# Patient Record
Sex: MALE | Race: WHITE | NOT HISPANIC OR LATINO | Employment: UNEMPLOYED | URBAN - METROPOLITAN AREA
[De-identification: names, ages, dates, MRNs, and addresses within clinical notes are randomized per-mention and may not be internally consistent; named-entity substitution may affect disease eponyms.]

---

## 2022-03-15 ENCOUNTER — OFFICE VISIT (OUTPATIENT)
Dept: FAMILY MEDICINE | Facility: CLINIC | Age: 62
End: 2022-03-15

## 2022-03-15 VITALS
DIASTOLIC BLOOD PRESSURE: 83 MMHG | BODY MASS INDEX: 27.77 KG/M2 | OXYGEN SATURATION: 98 % | RESPIRATION RATE: 18 BRPM | WEIGHT: 205 LBS | HEIGHT: 72 IN | HEART RATE: 82 BPM | SYSTOLIC BLOOD PRESSURE: 170 MMHG

## 2022-03-15 DIAGNOSIS — F17.200 SMOKER: ICD-10-CM

## 2022-03-15 DIAGNOSIS — Z76.89 ESTABLISHING CARE WITH NEW DOCTOR, ENCOUNTER FOR: ICD-10-CM

## 2022-03-15 DIAGNOSIS — I10 HYPERTENSION, UNSPECIFIED TYPE: Primary | ICD-10-CM

## 2022-03-15 DIAGNOSIS — R06.83 LOUD SNORING: ICD-10-CM

## 2022-03-15 PROCEDURE — 99499 NO LOS: ICD-10-PCS | Mod: S$GLB,,, | Performed by: NURSE PRACTITIONER

## 2022-03-15 PROCEDURE — 99203 PR OFFICE/OUTPT VISIT, NEW, LEVL III, 30-44 MIN: ICD-10-PCS | Mod: S$GLB,,, | Performed by: NURSE PRACTITIONER

## 2022-03-15 PROCEDURE — 99203 OFFICE O/P NEW LOW 30 MIN: CPT | Mod: S$GLB,,, | Performed by: NURSE PRACTITIONER

## 2022-03-15 PROCEDURE — 99499 UNLISTED E&M SERVICE: CPT | Mod: S$GLB,,, | Performed by: NURSE PRACTITIONER

## 2022-03-15 RX ORDER — LISINOPRIL AND HYDROCHLOROTHIAZIDE 20; 25 MG/1; MG/1
1 TABLET ORAL DAILY
COMMUNITY
Start: 2021-11-17 | End: 2022-03-15 | Stop reason: SDUPTHER

## 2022-03-15 RX ORDER — AMLODIPINE BESYLATE 5 MG/1
5 TABLET ORAL DAILY
Qty: 30 TABLET | Refills: 5 | Status: SHIPPED | OUTPATIENT
Start: 2022-03-15 | End: 2022-09-19 | Stop reason: SDUPTHER

## 2022-03-15 RX ORDER — ASPIRIN 81 MG/1
81 TABLET ORAL DAILY
COMMUNITY

## 2022-03-15 RX ORDER — LISINOPRIL AND HYDROCHLOROTHIAZIDE 20; 25 MG/1; MG/1
1 TABLET ORAL DAILY
Qty: 30 TABLET | Refills: 5 | Status: SHIPPED | OUTPATIENT
Start: 2022-03-15 | End: 2022-09-19 | Stop reason: SDUPTHER

## 2022-03-15 NOTE — PROGRESS NOTES
Subjective:      Patient ID: Sabas Augustine is a 61 y.o. male.    Chief Complaint: Establish Care and Hypertension      61-year-old male    Current Smoker    Past medical history hypertension      Pt is here today to establish PCP. Previous PCP out of [facility].     In regards to his high blood pressure, the patient is on lisinopril/HCTZ 20/25 mg. Took his med this AM around 5 am.  He is currently asymptomatic. Has been on BP meds for apx 1 year. No prior labs. No prior cardiac workup. He currently does not have insurance and can not afford further testing. Denies HA, visual disturbance, CP, palpitations, Sob, HALEY.     No acute issues reported.       Past Medical History:   Diagnosis Date    Hyperlipidemia     Hypertension       Social History     Socioeconomic History    Marital status:    Tobacco Use    Smoking status: Current Every Day Smoker     Packs/day: 1.00     Types: Cigarettes    Smokeless tobacco: Never Used   Substance and Sexual Activity    Alcohol use: Yes     Comment: rarely    Drug use: Never      Family History   Problem Relation Age of Onset    Heart disease Father     Cancer Father         Esophagus        ROS:   Review of Systems   Constitutional: Negative for appetite change, chills and fever.   Eyes: Negative for photophobia and visual disturbance.   Respiratory: Negative for cough, chest tightness, shortness of breath and wheezing.    Cardiovascular: Negative for chest pain.   Gastrointestinal: Negative for abdominal pain, diarrhea, nausea and vomiting.   Endocrine: Negative for polydipsia, polyphagia and polyuria.   Genitourinary: Negative for difficulty urinating, dysuria, flank pain and hematuria.   Musculoskeletal: Negative for back pain and neck pain.   Skin: Negative for rash.   Neurological: Negative for dizziness, tremors, syncope, facial asymmetry, light-headedness and headaches.   Hematological: Negative for adenopathy. Does not bruise/bleed easily.    Psychiatric/Behavioral: Negative for confusion, dysphoric mood, hallucinations, sleep disturbance and suicidal ideas.     Objective:   Physical Exam  Vitals and nursing note reviewed.   Constitutional:       General: He is awake. He is not in acute distress.     Appearance: Normal appearance. He is well-developed, well-groomed and overweight. He is not ill-appearing, toxic-appearing or diaphoretic.   HENT:      Head: Normocephalic and atraumatic.   Eyes:      General: No scleral icterus.     Pupils: Pupils are equal, round, and reactive to light.   Neck:      Vascular: No carotid bruit.   Cardiovascular:      Rate and Rhythm: Normal rate and regular rhythm.      Pulses: Normal pulses.      Heart sounds: Normal heart sounds. No murmur heard.    No friction rub. No gallop.   Pulmonary:      Effort: Pulmonary effort is normal.      Breath sounds: Examination of the right-lower field reveals decreased breath sounds. Examination of the left-lower field reveals decreased breath sounds. Decreased breath sounds present. No wheezing, rhonchi or rales.   Chest:   Breasts:      Right: No supraclavicular adenopathy.      Left: No supraclavicular adenopathy.       Abdominal:      General: Abdomen is protuberant. Bowel sounds are normal.      Palpations: Abdomen is soft.   Musculoskeletal:         General: Normal range of motion.      Cervical back: Normal range of motion and neck supple.      Right lower leg: No edema.      Left lower leg: No edema.   Lymphadenopathy:      Cervical: No cervical adenopathy.      Upper Body:      Right upper body: No supraclavicular adenopathy.      Left upper body: No supraclavicular adenopathy.   Skin:     General: Skin is warm and dry.      Capillary Refill: Capillary refill takes less than 2 seconds.      Coloration: Skin is not jaundiced.      Findings: No rash.   Neurological:      Mental Status: He is alert and oriented to person, place, and time. Mental status is at baseline.      Motor:  No weakness.      Gait: Gait normal.   Psychiatric:         Mood and Affect: Mood normal.         Behavior: Behavior normal. Behavior is cooperative.         Thought Content: Thought content normal.         Judgment: Judgment normal.       Assessment:     1. Hypertension, unspecified type    2. Establishing care with new doctor, encounter for    3. Smoker    4. Loud snoring      No images are attached to the encounter.   Plan:     Problem List Items Addressed This Visit        Cardiac/Vascular    Hypertension - Primary    Current Assessment & Plan     We discussed at length what uncontrolled high blood pressure can due to the body specifically the heart and renal.  He is uninsured at this point cannot afford further testing.  I advised the patient to get insurance and return to clinic once he has insurance so we can get the proper workup.      Plan to refer to Cardiology and get basline labs at that visit. For now, will add Amlodipine 5 mg to his current med. He will check his BP and notify me if he has not achieved target of < 140.            Relevant Medications    amLODIPine (NORVASC) 5 MG tablet    lisinopriL-hydrochlorothiazide (PRINZIDE,ZESTORETIC) 20-25 mg Tab       Other    Smoker      Other Visit Diagnoses     Establishing care with new doctor, encounter for        Relevant Medications    amLODIPine (NORVASC) 5 MG tablet    lisinopriL-hydrochlorothiazide (PRINZIDE,ZESTORETIC) 20-25 mg Tab    Loud snoring        likely has SAMMY contributing to HTN. Will eventually need SAMMY study.         Procedures     No results found for any previous visit.        No results found in the last 30 days.     All diagnostic data (labs/imaging) was reviewed with the patient and/or family member in the room.  All questions were answered to their liking. The patient and/or family member voiced understanding of all instructions provided. Expectations regarding follow up and treatment plan were voiced and confirmed prior to  departure. The patient was given orders/instructions at the end of the visit for reference. They were instructed to notify my office if they have not been contacted for imaging/referrals/labs/results in 1-2 weeks. They voiced understanding of all of the above.     Follow up:     There are no Patient Instructions on file for this visit.     Follow up in about 6 months (around 9/15/2022), or if symptoms worsen or fail to improve.

## 2022-03-15 NOTE — ASSESSMENT & PLAN NOTE
We discussed at length what uncontrolled high blood pressure can due to the body specifically the heart and renal.  He is uninsured at this point cannot afford further testing.  I advised the patient to get insurance and return to clinic once he has insurance so we can get the proper workup.      Plan to refer to Cardiology and get basline labs at that visit. For now, will add Amlodipine 5 mg to his current med. He will check his BP and notify me if he has not achieved target of < 140.

## 2023-03-08 DIAGNOSIS — I10 HYPERTENSION: ICD-10-CM

## 2023-10-03 DIAGNOSIS — Z76.89 ESTABLISHING CARE WITH NEW DOCTOR, ENCOUNTER FOR: ICD-10-CM

## 2023-10-03 DIAGNOSIS — I10 HYPERTENSION, UNSPECIFIED TYPE: ICD-10-CM

## 2023-10-03 NOTE — TELEPHONE ENCOUNTER
----- Message from Esther Daniel sent at 10/3/2023  1:36 PM CDT -----  Contact: pt  Pt wife calling about status of refill for pt b/p medication for lisinopriL-hydrochlorothiazide (PRINZIDE,ZESTORETIC) 20-25 mg and amLODIPine (NORVASC) 5 MG.   Pt taking last pill today.   Pt called yesterday.  Pt can be reached at [phone] (home)       [facility]/pharmacy #[id number] - [place], [place] - [address] [facility]  [address]  [place] [address]  Phone: [phone] Fax: [phone]    Thanks,

## 2023-10-04 RX ORDER — AMLODIPINE BESYLATE 5 MG/1
5 TABLET ORAL DAILY
Qty: 12 TABLET | Refills: 0 | Status: SHIPPED | OUTPATIENT
Start: 2023-10-04 | End: 2023-10-13 | Stop reason: SDUPTHER

## 2023-10-04 RX ORDER — LISINOPRIL AND HYDROCHLOROTHIAZIDE 20; 25 MG/1; MG/1
1 TABLET ORAL DAILY
Qty: 12 TABLET | Refills: 0 | Status: SHIPPED | OUTPATIENT
Start: 2023-10-04 | End: 2023-10-13 | Stop reason: SDUPTHER

## 2023-10-13 ENCOUNTER — OFFICE VISIT (OUTPATIENT)
Dept: FAMILY MEDICINE | Facility: CLINIC | Age: 63
End: 2023-10-13

## 2023-10-13 VITALS — DIASTOLIC BLOOD PRESSURE: 84 MMHG | SYSTOLIC BLOOD PRESSURE: 132 MMHG | WEIGHT: 202 LBS | BODY MASS INDEX: 27.4 KG/M2

## 2023-10-13 DIAGNOSIS — Z12.5 SCREENING FOR MALIGNANT NEOPLASM OF PROSTATE: ICD-10-CM

## 2023-10-13 DIAGNOSIS — Z00.00 PREVENTATIVE HEALTH CARE: Primary | ICD-10-CM

## 2023-10-13 DIAGNOSIS — Z91.89 ENCOUNTER FOR HEPATITIS C VIRUS SCREENING TEST FOR HIGH RISK PATIENT: ICD-10-CM

## 2023-10-13 DIAGNOSIS — Z76.0 ENCOUNTER FOR MEDICATION REFILL: ICD-10-CM

## 2023-10-13 DIAGNOSIS — Z79.899 LONG TERM USE OF DRUG: ICD-10-CM

## 2023-10-13 DIAGNOSIS — Z11.59 ENCOUNTER FOR HEPATITIS C VIRUS SCREENING TEST FOR HIGH RISK PATIENT: ICD-10-CM

## 2023-10-13 DIAGNOSIS — I10 HYPERTENSION, UNSPECIFIED TYPE: ICD-10-CM

## 2023-10-13 DIAGNOSIS — Z13.220 SCREENING FOR LIPOID DISORDERS: ICD-10-CM

## 2023-10-13 DIAGNOSIS — F17.200 SMOKER: ICD-10-CM

## 2023-10-13 DIAGNOSIS — Z13.1 SCREENING FOR DIABETES MELLITUS: ICD-10-CM

## 2023-10-13 DIAGNOSIS — Z13.6 SCREENING, ISCHEMIC HEART DISEASE: ICD-10-CM

## 2023-10-13 DIAGNOSIS — Z11.4 SCREENING FOR HIV (HUMAN IMMUNODEFICIENCY VIRUS): ICD-10-CM

## 2023-10-13 PROCEDURE — 99396 PREV VISIT EST AGE 40-64: CPT | Mod: S$GLB,,, | Performed by: NURSE PRACTITIONER

## 2023-10-13 PROCEDURE — 99396 PR PREVENTIVE VISIT,EST,40-64: ICD-10-PCS | Mod: S$GLB,,, | Performed by: NURSE PRACTITIONER

## 2023-10-13 RX ORDER — AMLODIPINE BESYLATE 5 MG/1
5 TABLET ORAL DAILY
Qty: 90 TABLET | Refills: 2 | Status: SHIPPED | OUTPATIENT
Start: 2023-10-13

## 2023-10-13 RX ORDER — LISINOPRIL AND HYDROCHLOROTHIAZIDE 20; 25 MG/1; MG/1
1 TABLET ORAL DAILY
Qty: 90 TABLET | Refills: 2 | Status: SHIPPED | OUTPATIENT
Start: 2023-10-13

## 2023-10-13 NOTE — ASSESSMENT & PLAN NOTE
PLAN     get basline labs. We discussed at length what uncontrolled high blood pressure can due to the body specifically the heart and renal.  He is uninsured at this point cannot afford further testing.  I advised the patient to discuss cost of labs with the Pathology lab. He was given orders prior to departure. For now, continue current meds.     He will eventually need further cardiac workup. He voiced understanding

## 2023-10-13 NOTE — PROGRESS NOTES
Subjective:      Patient ID: Sabas Augustine is a 63 y.o. male.    Chief Complaint: Follow-up      61-year-old male    Current Smoker    Past medical history hypertension      Pt is here today for AWV, med refills, and follow up. He remains uninsured.  No prior colonoscopy.     In regards to his high blood pressure, the patient is on lisinopril/HCTZ 20/25 mg and norvasc 5 mg. He is currently asymptomatic. No prior cardiac workup. He currently does not have insurance and can not afford further testing. Denies HA, visual disturbance, CP, palpitations, Sob, HALEY.     No acute issues reported.         Past Medical History:   Diagnosis Date    Hyperlipidemia     Hypertension       Social History     Socioeconomic History    Marital status:    Tobacco Use    Smoking status: Every Day     Current packs/day: 1.00     Types: Cigarettes    Smokeless tobacco: Never   Substance and Sexual Activity    Alcohol use: Yes     Comment: rarely    Drug use: Never      Family History   Problem Relation Age of Onset    Heart disease Father     Cancer Father         Esophagus        ROS:   Review of Systems   Constitutional:  Negative for appetite change, chills and fever.   Eyes:  Negative for photophobia and visual disturbance.   Respiratory:  Negative for cough, chest tightness, shortness of breath and wheezing.    Cardiovascular:  Negative for chest pain.   Gastrointestinal:  Negative for abdominal pain, diarrhea, nausea and vomiting.   Endocrine: Negative for polydipsia, polyphagia and polyuria.   Genitourinary:  Negative for difficulty urinating, dysuria, flank pain and hematuria.   Musculoskeletal:  Negative for back pain and neck pain.   Skin:  Negative for rash.   Neurological:  Negative for dizziness, tremors, syncope, facial asymmetry, light-headedness and headaches.   Hematological:  Negative for adenopathy. Does not bruise/bleed easily.   Psychiatric/Behavioral:  Negative for confusion, dysphoric mood, hallucinations,  sleep disturbance and suicidal ideas.      Objective:   Physical Exam  Vitals and nursing note reviewed.   Constitutional:       General: He is awake. He is not in acute distress.     Appearance: Normal appearance. He is well-developed, well-groomed and overweight. He is not ill-appearing, toxic-appearing or diaphoretic.   HENT:      Head: Normocephalic and atraumatic.   Eyes:      General: No scleral icterus.     Pupils: Pupils are equal, round, and reactive to light.   Neck:      Vascular: No carotid bruit.   Cardiovascular:      Rate and Rhythm: Normal rate and regular rhythm.      Pulses: Normal pulses.      Heart sounds: Normal heart sounds. No murmur heard.     No friction rub. No gallop.   Pulmonary:      Effort: Pulmonary effort is normal.      Breath sounds: Examination of the right-lower field reveals decreased breath sounds. Examination of the left-lower field reveals decreased breath sounds. Decreased breath sounds present. No wheezing, rhonchi or rales.   Abdominal:      General: Abdomen is protuberant. Bowel sounds are normal.      Palpations: Abdomen is soft.   Musculoskeletal:         General: Normal range of motion.      Cervical back: Normal range of motion and neck supple.      Right lower leg: No edema.      Left lower leg: No edema.   Lymphadenopathy:      Cervical: No cervical adenopathy.      Upper Body:      Right upper body: No supraclavicular adenopathy.      Left upper body: No supraclavicular adenopathy.   Skin:     General: Skin is warm and dry.      Capillary Refill: Capillary refill takes less than 2 seconds.      Coloration: Skin is not jaundiced.      Findings: No rash.   Neurological:      Mental Status: He is alert and oriented to person, place, and time. Mental status is at baseline.      Motor: No weakness.      Gait: Gait normal.   Psychiatric:         Mood and Affect: Mood normal.         Behavior: Behavior normal. Behavior is cooperative.         Thought Content: Thought  content normal.         Judgment: Judgment normal.       Assessment:     1. Preventative health care    2. Hypertension, unspecified type    3. Encounter for medication refill    4. Long term use of drug    5. Screening for lipoid disorders    6. Screening for malignant neoplasm of prostate    7. Screening for diabetes mellitus    8. Screening, ischemic heart disease    9. Encounter for hepatitis C virus screening test for high risk patient    10. Screening for HIV (human immunodeficiency virus)    11. Smoker      No images are attached to the encounter.   Plan:     Problem List Items Addressed This Visit          Cardiac/Vascular    Hypertension    Current Assessment & Plan         PLAN     get basline labs. We discussed at length what uncontrolled high blood pressure can due to the body specifically the heart and renal.  He is uninsured at this point cannot afford further testing.  I advised the patient to discuss cost of labs with the Pathology lab. He was given orders prior to departure. For now, continue current meds.     He will eventually need further cardiac workup. He voiced understanding             Relevant Medications    amLODIPine (NORVASC) 5 MG tablet    lisinopriL-hydrochlorothiazide (PRINZIDE,ZESTORETIC) 20-25 mg Tab    Other Relevant Orders    Vitamin D    Prostate Specific Antigen, Diagnostic    Urinalysis, Reflex to Urine Culture Urine, Clean Catch    TSH+Free T4    Lipid Panel    Hemoglobin A1C    Comprehensive Metabolic Panel    CBC Auto Differential       Other    Smoker     Other Visit Diagnoses       Preventative health care    -  Primary    will get labs. Unable to afford colonoscopy. Refuses vaccinations.     Relevant Orders    Vitamin D    Prostate Specific Antigen, Diagnostic    Urinalysis, Reflex to Urine Culture Urine, Clean Catch    TSH+Free T4    Lipid Panel    Hemoglobin A1C    Comprehensive Metabolic Panel    CBC Auto Differential    Hepatitis C Antibody    HIV 1/2 Ag/Ab (4th Gen)     Encounter for medication refill        Relevant Orders    Vitamin D    Prostate Specific Antigen, Diagnostic    Urinalysis, Reflex to Urine Culture Urine, Clean Catch    TSH+Free T4    Lipid Panel    Hemoglobin A1C    Comprehensive Metabolic Panel    CBC Auto Differential    Long term use of drug        Relevant Orders    Vitamin D    Prostate Specific Antigen, Diagnostic    Urinalysis, Reflex to Urine Culture Urine, Clean Catch    TSH+Free T4    Lipid Panel    Hemoglobin A1C    Comprehensive Metabolic Panel    CBC Auto Differential    Screening for lipoid disorders        Relevant Orders    Vitamin D    Prostate Specific Antigen, Diagnostic    Urinalysis, Reflex to Urine Culture Urine, Clean Catch    TSH+Free T4    Lipid Panel    Hemoglobin A1C    Comprehensive Metabolic Panel    CBC Auto Differential    Screening for malignant neoplasm of prostate        Relevant Orders    Vitamin D    Prostate Specific Antigen, Diagnostic    Urinalysis, Reflex to Urine Culture Urine, Clean Catch    TSH+Free T4    Lipid Panel    Hemoglobin A1C    Comprehensive Metabolic Panel    CBC Auto Differential    Screening for diabetes mellitus        Relevant Orders    Vitamin D    Prostate Specific Antigen, Diagnostic    Urinalysis, Reflex to Urine Culture Urine, Clean Catch    TSH+Free T4    Lipid Panel    Hemoglobin A1C    Comprehensive Metabolic Panel    CBC Auto Differential    Screening, ischemic heart disease        Relevant Orders    Vitamin D    Prostate Specific Antigen, Diagnostic    Urinalysis, Reflex to Urine Culture Urine, Clean Catch    TSH+Free T4    Lipid Panel    Hemoglobin A1C    Comprehensive Metabolic Panel    CBC Auto Differential    Encounter for hepatitis C virus screening test for high risk patient        Relevant Orders    Hepatitis C Antibody    HIV 1/2 Ag/Ab (4th Gen)    Screening for HIV (human immunodeficiency virus)        Relevant Orders    Hepatitis C Antibody    HIV 1/2 Ag/Ab (4th Gen)           Procedures     No visits with results within 1 Month(s) from this visit.   Latest known visit with results is:   No results found for any previous visit.        No results found in the last 30 days.       Duration of encounter:  minutes  This includes face-to-face time and non face-to-face time preparing to see the patient (eg, review of tests), obtaining and/or reviewing separately obtained history, documenting clinical information in the electronic or other health record, independently interpreting resultsand communicating results to the patient/family/caregiver, or care coordination    All diagnostic data (labs/imaging) was reviewed with the patient and/or family member in the room.  All questions were answered to their liking. The patient and/or family member voiced understanding of all instructions provided. Expectations regarding follow up and treatment plan were voiced and confirmed prior to departure. The patient was given orders/instructions at the end of the visit for reference. They were instructed to notify my office if they have not been contacted for imaging/referrals/labs/results in 1-2 weeks. They voiced understanding of all of the above.     Follow up:     There are no Patient Instructions on file for this visit.     Follow up in about 1 year (around 10/13/2024), or if symptoms worsen or fail to improve.

## 2024-07-28 DIAGNOSIS — I10 HYPERTENSION, UNSPECIFIED TYPE: ICD-10-CM

## 2024-07-29 RX ORDER — LISINOPRIL AND HYDROCHLOROTHIAZIDE 20; 25 MG/1; MG/1
1 TABLET ORAL
Qty: 90 TABLET | Refills: 2 | Status: SHIPPED | OUTPATIENT
Start: 2024-07-29

## 2024-07-29 RX ORDER — AMLODIPINE BESYLATE 5 MG/1
5 TABLET ORAL
Qty: 90 TABLET | Refills: 2 | Status: SHIPPED | OUTPATIENT
Start: 2024-07-29

## 2025-03-14 DIAGNOSIS — I10 HYPERTENSION, UNSPECIFIED TYPE: ICD-10-CM

## 2025-03-18 RX ORDER — LISINOPRIL AND HYDROCHLOROTHIAZIDE 20; 25 MG/1; MG/1
1 TABLET ORAL
Qty: 90 TABLET | Refills: 0 | Status: SHIPPED | OUTPATIENT
Start: 2025-03-18